# Patient Record
Sex: MALE | Race: OTHER | Employment: PART TIME | URBAN - METROPOLITAN AREA
[De-identification: names, ages, dates, MRNs, and addresses within clinical notes are randomized per-mention and may not be internally consistent; named-entity substitution may affect disease eponyms.]

---

## 2018-01-10 ENCOUNTER — HOSPITAL ENCOUNTER (INPATIENT)
Age: 33
LOS: 2 days | Discharge: HOME HEALTH | DRG: 310 | End: 2018-01-12
Attending: EMERGENCY MEDICINE | Admitting: FAMILY MEDICINE

## 2018-01-10 ENCOUNTER — APPOINTMENT (OUTPATIENT)
Dept: GENERAL RADIOLOGY | Age: 33
DRG: 310 | End: 2018-01-10

## 2018-01-10 DIAGNOSIS — R07.9 CHEST PAIN, UNSPECIFIED TYPE: ICD-10-CM

## 2018-01-10 DIAGNOSIS — G47.33 OBSTRUCTIVE SLEEP APNEA SYNDROME: Primary | ICD-10-CM

## 2018-01-10 DIAGNOSIS — I48.91 ATRIAL FIBRILLATION, UNSPECIFIED TYPE (HCC): ICD-10-CM

## 2018-01-10 LAB
ABSOLUTE EOS #: 0 K/UL (ref 0–0.4)
ABSOLUTE IMMATURE GRANULOCYTE: ABNORMAL K/UL (ref 0–0.3)
ABSOLUTE LYMPH #: 2 K/UL (ref 1–4.8)
ABSOLUTE MONO #: 0.5 K/UL (ref 0.2–0.8)
ALBUMIN SERPL-MCNC: 4.7 G/DL (ref 3.5–5.2)
ALBUMIN/GLOBULIN RATIO: NORMAL (ref 1–2.5)
ALP BLD-CCNC: 81 U/L (ref 40–129)
ALT SERPL-CCNC: 21 U/L (ref 5–41)
AMPHETAMINE SCREEN URINE: NEGATIVE
ANION GAP SERPL CALCULATED.3IONS-SCNC: 17 MMOL/L (ref 9–17)
AST SERPL-CCNC: 18 U/L
BARBITURATE SCREEN URINE: NEGATIVE
BASOPHILS # BLD: 0 % (ref 0–2)
BASOPHILS ABSOLUTE: 0 K/UL (ref 0–0.2)
BENZODIAZEPINE SCREEN, URINE: NEGATIVE
BILIRUB SERPL-MCNC: 0.81 MG/DL (ref 0.3–1.2)
BILIRUBIN DIRECT: 0.14 MG/DL
BILIRUBIN URINE: NEGATIVE
BILIRUBIN, INDIRECT: 0.67 MG/DL (ref 0–1)
BUN BLDV-MCNC: 15 MG/DL (ref 6–20)
BUN/CREAT BLD: 18 (ref 9–20)
BUPRENORPHINE URINE: ABNORMAL
CALCIUM SERPL-MCNC: 9.5 MG/DL (ref 8.6–10.4)
CANNABINOID SCREEN URINE: NEGATIVE
CHLORIDE BLD-SCNC: 101 MMOL/L (ref 98–107)
CO2: 23 MMOL/L (ref 20–31)
COCAINE METABOLITE, URINE: NEGATIVE
COLOR: YELLOW
COMMENT UA: NORMAL
CREAT SERPL-MCNC: 0.82 MG/DL (ref 0.7–1.2)
D-DIMER QUANTITATIVE: 0.29 MG/L FEU
DIFFERENTIAL TYPE: ABNORMAL
EKG ATRIAL RATE: 153 BPM
EKG Q-T INTERVAL: 340 MS
EKG QRS DURATION: 84 MS
EKG QTC CALCULATION (BAZETT): 401 MS
EKG R AXIS: 38 DEGREES
EKG T AXIS: 42 DEGREES
EKG VENTRICULAR RATE: 84 BPM
EOSINOPHILS RELATIVE PERCENT: 0 % (ref 1–4)
GFR AFRICAN AMERICAN: >60 ML/MIN
GFR NON-AFRICAN AMERICAN: >60 ML/MIN
GFR SERPL CREATININE-BSD FRML MDRD: NORMAL ML/MIN/{1.73_M2}
GFR SERPL CREATININE-BSD FRML MDRD: NORMAL ML/MIN/{1.73_M2}
GLOBULIN: NORMAL G/DL (ref 1.5–3.8)
GLUCOSE BLD-MCNC: 84 MG/DL (ref 70–99)
GLUCOSE URINE: NEGATIVE
HCT VFR BLD CALC: 48.7 % (ref 41–53)
HEMOGLOBIN: 16.4 G/DL (ref 13.5–17.5)
IMMATURE GRANULOCYTES: ABNORMAL %
INR BLD: 1
KETONES, URINE: NEGATIVE
LEUKOCYTE ESTERASE, URINE: NEGATIVE
LYMPHOCYTES # BLD: 30 % (ref 24–44)
MAGNESIUM: 2 MG/DL (ref 1.6–2.6)
MCH RBC QN AUTO: 27.9 PG (ref 26–34)
MCHC RBC AUTO-ENTMCNC: 33.7 G/DL (ref 31–37)
MCV RBC AUTO: 82.7 FL (ref 80–100)
MDMA URINE: ABNORMAL
METHADONE SCREEN, URINE: NEGATIVE
METHAMPHETAMINE, URINE: ABNORMAL
MONOCYTES # BLD: 7 % (ref 1–7)
MYOGLOBIN: <21 NG/ML (ref 28–72)
NITRITE, URINE: NEGATIVE
OPIATES, URINE: POSITIVE
OXYCODONE SCREEN URINE: NEGATIVE
PARTIAL THROMBOPLASTIN TIME: 31.5 SEC (ref 23–31)
PDW BLD-RTO: 12.9 % (ref 11.5–14.5)
PH UA: 6 (ref 5–8)
PHENCYCLIDINE, URINE: NEGATIVE
PLATELET # BLD: 220 K/UL (ref 130–400)
PLATELET ESTIMATE: ABNORMAL
PMV BLD AUTO: 9.9 FL (ref 6–12)
POTASSIUM SERPL-SCNC: 4.4 MMOL/L (ref 3.7–5.3)
PROPOXYPHENE, URINE: ABNORMAL
PROTEIN UA: NEGATIVE
PROTHROMBIN TIME: 10.2 SEC (ref 9.7–11.6)
RBC # BLD: 5.89 M/UL (ref 4.5–5.9)
RBC # BLD: ABNORMAL 10*6/UL
SEG NEUTROPHILS: 63 % (ref 36–66)
SEGMENTED NEUTROPHILS ABSOLUTE COUNT: 4.2 K/UL (ref 1.8–7.7)
SODIUM BLD-SCNC: 141 MMOL/L (ref 135–144)
SPECIFIC GRAVITY UA: 1.02 (ref 1–1.03)
TEST INFORMATION: ABNORMAL
THYROXINE, FREE: 1.24 NG/DL (ref 0.93–1.7)
TOTAL PROTEIN: 7.5 G/DL (ref 6.4–8.3)
TRICYCLIC ANTIDEPRESSANTS, UR: ABNORMAL
TROPONIN INTERP: ABNORMAL
TROPONIN T: <0.03 NG/ML
TSH SERPL DL<=0.05 MIU/L-ACNC: 1.68 MIU/L (ref 0.3–5)
TURBIDITY: CLEAR
URINE HGB: NEGATIVE
UROBILINOGEN, URINE: NORMAL
WBC # BLD: 6.7 K/UL (ref 3.5–11)
WBC # BLD: ABNORMAL 10*3/UL

## 2018-01-10 PROCEDURE — 85730 THROMBOPLASTIN TIME PARTIAL: CPT

## 2018-01-10 PROCEDURE — 6370000000 HC RX 637 (ALT 250 FOR IP): Performed by: EMERGENCY MEDICINE

## 2018-01-10 PROCEDURE — G0480 DRUG TEST DEF 1-7 CLASSES: HCPCS

## 2018-01-10 PROCEDURE — 85610 PROTHROMBIN TIME: CPT

## 2018-01-10 PROCEDURE — 81003 URINALYSIS AUTO W/O SCOPE: CPT

## 2018-01-10 PROCEDURE — 71045 X-RAY EXAM CHEST 1 VIEW: CPT

## 2018-01-10 PROCEDURE — 84439 ASSAY OF FREE THYROXINE: CPT

## 2018-01-10 PROCEDURE — 96374 THER/PROPH/DIAG INJ IV PUSH: CPT

## 2018-01-10 PROCEDURE — 1200000000 HC SEMI PRIVATE

## 2018-01-10 PROCEDURE — 80307 DRUG TEST PRSMV CHEM ANLYZR: CPT

## 2018-01-10 PROCEDURE — 80076 HEPATIC FUNCTION PANEL: CPT

## 2018-01-10 PROCEDURE — 83874 ASSAY OF MYOGLOBIN: CPT

## 2018-01-10 PROCEDURE — 84484 ASSAY OF TROPONIN QUANT: CPT

## 2018-01-10 PROCEDURE — 84443 ASSAY THYROID STIM HORMONE: CPT

## 2018-01-10 PROCEDURE — 2060000000 HC ICU INTERMEDIATE R&B

## 2018-01-10 PROCEDURE — 85025 COMPLETE CBC W/AUTO DIFF WBC: CPT

## 2018-01-10 PROCEDURE — 99285 EMERGENCY DEPT VISIT HI MDM: CPT

## 2018-01-10 PROCEDURE — 96375 TX/PRO/DX INJ NEW DRUG ADDON: CPT

## 2018-01-10 PROCEDURE — 85379 FIBRIN DEGRADATION QUANT: CPT

## 2018-01-10 PROCEDURE — 6360000002 HC RX W HCPCS: Performed by: EMERGENCY MEDICINE

## 2018-01-10 PROCEDURE — 80048 BASIC METABOLIC PNL TOTAL CA: CPT

## 2018-01-10 PROCEDURE — 83735 ASSAY OF MAGNESIUM: CPT

## 2018-01-10 PROCEDURE — 93005 ELECTROCARDIOGRAM TRACING: CPT

## 2018-01-10 RX ORDER — POTASSIUM CHLORIDE 7.45 MG/ML
10 INJECTION INTRAVENOUS PRN
Status: DISCONTINUED | OUTPATIENT
Start: 2018-01-10 | End: 2018-01-12 | Stop reason: HOSPADM

## 2018-01-10 RX ORDER — DOCUSATE SODIUM 100 MG/1
100 CAPSULE, LIQUID FILLED ORAL 2 TIMES DAILY
Status: DISCONTINUED | OUTPATIENT
Start: 2018-01-11 | End: 2018-01-12 | Stop reason: HOSPADM

## 2018-01-10 RX ORDER — HYDROCODONE BITARTRATE AND ACETAMINOPHEN 5; 325 MG/1; MG/1
1 TABLET ORAL EVERY 4 HOURS PRN
Status: DISCONTINUED | OUTPATIENT
Start: 2018-01-10 | End: 2018-01-12 | Stop reason: HOSPADM

## 2018-01-10 RX ORDER — MORPHINE SULFATE 2 MG/ML
2 INJECTION, SOLUTION INTRAMUSCULAR; INTRAVENOUS
Status: DISCONTINUED | OUTPATIENT
Start: 2018-01-10 | End: 2018-01-12 | Stop reason: HOSPADM

## 2018-01-10 RX ORDER — SODIUM CHLORIDE 0.9 % (FLUSH) 0.9 %
10 SYRINGE (ML) INJECTION EVERY 12 HOURS SCHEDULED
Status: DISCONTINUED | OUTPATIENT
Start: 2018-01-11 | End: 2018-01-12 | Stop reason: HOSPADM

## 2018-01-10 RX ORDER — BISACODYL 10 MG
10 SUPPOSITORY, RECTAL RECTAL DAILY PRN
Status: DISCONTINUED | OUTPATIENT
Start: 2018-01-10 | End: 2018-01-12 | Stop reason: HOSPADM

## 2018-01-10 RX ORDER — ASPIRIN 81 MG/1
324 TABLET, CHEWABLE ORAL ONCE
Status: COMPLETED | OUTPATIENT
Start: 2018-01-10 | End: 2018-01-10

## 2018-01-10 RX ORDER — ONDANSETRON 2 MG/ML
4 INJECTION INTRAMUSCULAR; INTRAVENOUS EVERY 6 HOURS PRN
Status: DISCONTINUED | OUTPATIENT
Start: 2018-01-10 | End: 2018-01-12 | Stop reason: HOSPADM

## 2018-01-10 RX ORDER — NITROGLYCERIN 0.4 MG/1
0.4 TABLET SUBLINGUAL EVERY 5 MIN PRN
Status: DISCONTINUED | OUTPATIENT
Start: 2018-01-10 | End: 2018-01-12

## 2018-01-10 RX ORDER — SODIUM CHLORIDE 0.9 % (FLUSH) 0.9 %
10 SYRINGE (ML) INJECTION PRN
Status: DISCONTINUED | OUTPATIENT
Start: 2018-01-10 | End: 2018-01-12 | Stop reason: HOSPADM

## 2018-01-10 RX ORDER — ONDANSETRON 2 MG/ML
4 INJECTION INTRAMUSCULAR; INTRAVENOUS ONCE
Status: COMPLETED | OUTPATIENT
Start: 2018-01-10 | End: 2018-01-10

## 2018-01-10 RX ORDER — MORPHINE SULFATE 4 MG/ML
4 INJECTION, SOLUTION INTRAMUSCULAR; INTRAVENOUS
Status: DISCONTINUED | OUTPATIENT
Start: 2018-01-10 | End: 2018-01-12 | Stop reason: HOSPADM

## 2018-01-10 RX ORDER — POTASSIUM CHLORIDE 20 MEQ/1
40 TABLET, EXTENDED RELEASE ORAL PRN
Status: DISCONTINUED | OUTPATIENT
Start: 2018-01-10 | End: 2018-01-12 | Stop reason: HOSPADM

## 2018-01-10 RX ORDER — MAGNESIUM SULFATE 1 G/100ML
1 INJECTION INTRAVENOUS PRN
Status: DISCONTINUED | OUTPATIENT
Start: 2018-01-10 | End: 2018-01-12 | Stop reason: HOSPADM

## 2018-01-10 RX ORDER — MORPHINE SULFATE 2 MG/ML
2 INJECTION, SOLUTION INTRAMUSCULAR; INTRAVENOUS ONCE
Status: COMPLETED | OUTPATIENT
Start: 2018-01-10 | End: 2018-01-10

## 2018-01-10 RX ORDER — ACETAMINOPHEN 325 MG/1
650 TABLET ORAL EVERY 4 HOURS PRN
Status: DISCONTINUED | OUTPATIENT
Start: 2018-01-10 | End: 2018-01-12 | Stop reason: HOSPADM

## 2018-01-10 RX ORDER — POTASSIUM CHLORIDE 20MEQ/15ML
40 LIQUID (ML) ORAL PRN
Status: DISCONTINUED | OUTPATIENT
Start: 2018-01-10 | End: 2018-01-12 | Stop reason: HOSPADM

## 2018-01-10 RX ADMIN — ONDANSETRON 4 MG: 2 INJECTION INTRAMUSCULAR; INTRAVENOUS at 20:29

## 2018-01-10 RX ADMIN — MORPHINE SULFATE 2 MG: 2 INJECTION, SOLUTION INTRAMUSCULAR; INTRAVENOUS at 20:43

## 2018-01-10 RX ADMIN — ASPIRIN 81 MG 324 MG: 81 TABLET ORAL at 20:28

## 2018-01-10 ASSESSMENT — ENCOUNTER SYMPTOMS
SORE THROAT: 0
RHINORRHEA: 0
DIARRHEA: 0
ABDOMINAL PAIN: 0
COLOR CHANGE: 0
SINUS PRESSURE: 0
COUGH: 0
SHORTNESS OF BREATH: 0
NAUSEA: 0
PHOTOPHOBIA: 0
EYE PAIN: 0
VOMITING: 0

## 2018-01-10 ASSESSMENT — PAIN SCALES - GENERAL
PAINLEVEL_OUTOF10: 5
PAINLEVEL_OUTOF10: 2

## 2018-01-10 ASSESSMENT — PAIN DESCRIPTION - DESCRIPTORS: DESCRIPTORS: ACHING

## 2018-01-10 ASSESSMENT — PAIN DESCRIPTION - PAIN TYPE: TYPE: ACUTE PAIN

## 2018-01-10 ASSESSMENT — PAIN DESCRIPTION - LOCATION: LOCATION: CHEST

## 2018-01-10 NOTE — ED PROVIDER NOTES
Musculoskeletal: Negative for arthralgias, myalgias, neck pain and neck stiffness. Skin: Negative for color change and rash. Neurological: Positive for dizziness. Negative for syncope, facial asymmetry, speech difficulty, weakness, light-headedness, numbness and headaches. Except as noted above the remainder of the review of systems was reviewed and negative. PHYSICAL EXAM    (up to 7 for level 4, 8 or more for level 5)     ED Triage Vitals [01/10/18 1753]   BP Temp Temp src Pulse Resp SpO2 Height Weight   (!) 140/77 98.3 °F (36.8 °C) -- 104 18 98 % 5' 9\" (1.753 m) 184 lb 1 oz (83.5 kg)     Physical Exam   Constitutional: He is oriented to person, place, and time. He appears well-developed and well-nourished. No distress. HENT:   Mouth/Throat: Oropharynx is clear and moist.   Eyes: Conjunctivae and EOM are normal. Pupils are equal, round, and reactive to light. Cardiovascular: Normal rate, regular rhythm and normal heart sounds. Pulmonary/Chest: Effort normal and breath sounds normal. No respiratory distress. He has no wheezes. He has no rales. Abdominal: Soft. Bowel sounds are normal. He exhibits no distension. There is no tenderness. Musculoskeletal: He exhibits no edema. Neurological: He is alert and oriented to person, place, and time. No cranial nerve deficit. GCS eye subscore is 4. GCS verbal subscore is 5. GCS motor subscore is 6. Skin: Skin is warm and dry. No rash noted. He is not diaphoretic. Psychiatric: He has a normal mood and affect. His behavior is normal.   Vitals reviewed. DIAGNOSTIC RESULTS     EKG: All EKG's are interpreted by the Emergency Department Physician who either signs or Co-signs this chart in the absence of a cardiologist.  EKG showed a-fib. It was interpreted by Dr. Deep Romero after completion.       RADIOLOGY:   Non-plain film images such as CT, Ultrasound and MRI are read by the radiologist. Plain radiographic images are visualized and preliminarily JOELLE Baptiste NP  01/10/18 0693

## 2018-01-11 ENCOUNTER — APPOINTMENT (OUTPATIENT)
Dept: NUCLEAR MEDICINE | Age: 33
DRG: 310 | End: 2018-01-11

## 2018-01-11 PROBLEM — E78.1 HYPERTRIGLYCERIDEMIA: Status: ACTIVE | Noted: 2018-01-11

## 2018-01-11 PROBLEM — I48.0 PAROXYSMAL ATRIAL FIBRILLATION (HCC): Status: ACTIVE | Noted: 2018-01-11

## 2018-01-11 LAB
ACETAMINOPHEN LEVEL: <10 UG/ML (ref 10–30)
ALBUMIN SERPL-MCNC: 4.1 G/DL (ref 3.5–5.2)
ALBUMIN/GLOBULIN RATIO: NORMAL (ref 1–2.5)
ALP BLD-CCNC: 74 U/L (ref 40–129)
ALT SERPL-CCNC: 20 U/L (ref 5–41)
ANION GAP SERPL CALCULATED.3IONS-SCNC: 11 MMOL/L (ref 9–17)
AST SERPL-CCNC: 16 U/L
BILIRUB SERPL-MCNC: 0.74 MG/DL (ref 0.3–1.2)
BUN BLDV-MCNC: 15 MG/DL (ref 6–20)
BUN/CREAT BLD: 16 (ref 9–20)
CALCIUM SERPL-MCNC: 8.8 MG/DL (ref 8.6–10.4)
CHLORIDE BLD-SCNC: 104 MMOL/L (ref 98–107)
CHOLESTEROL/HDL RATIO: 6.5
CHOLESTEROL: 194 MG/DL
CO2: 27 MMOL/L (ref 20–31)
CREAT SERPL-MCNC: 0.91 MG/DL (ref 0.7–1.2)
ETHANOL PERCENT: <0.01 %
ETHANOL: <10 MG/DL
GFR AFRICAN AMERICAN: >60 ML/MIN
GFR NON-AFRICAN AMERICAN: >60 ML/MIN
GFR SERPL CREATININE-BSD FRML MDRD: NORMAL ML/MIN/{1.73_M2}
GFR SERPL CREATININE-BSD FRML MDRD: NORMAL ML/MIN/{1.73_M2}
GLUCOSE BLD-MCNC: 99 MG/DL (ref 70–99)
HCT VFR BLD CALC: 46.7 % (ref 41–53)
HDLC SERPL-MCNC: 30 MG/DL
HEMOGLOBIN: 15.9 G/DL (ref 13.5–17.5)
LDL CHOLESTEROL: 121 MG/DL (ref 0–130)
LV EF: 51 %
LV EF: 65 %
LVEF MODALITY: NORMAL
LVEF MODALITY: NORMAL
MAGNESIUM: 2.2 MG/DL (ref 1.6–2.6)
MCH RBC QN AUTO: 28.6 PG (ref 26–34)
MCHC RBC AUTO-ENTMCNC: 34.1 G/DL (ref 31–37)
MCV RBC AUTO: 83.8 FL (ref 80–100)
PDW BLD-RTO: 13 % (ref 11.5–14.5)
PLATELET # BLD: 211 K/UL (ref 130–400)
PMV BLD AUTO: 9.6 FL (ref 6–12)
POTASSIUM SERPL-SCNC: 3.9 MMOL/L (ref 3.7–5.3)
RBC # BLD: 5.57 M/UL (ref 4.5–5.9)
SALICYLATE LEVEL: <1 MG/DL (ref 3–10)
SODIUM BLD-SCNC: 142 MMOL/L (ref 135–144)
T3 FREE: 4.02 PG/ML (ref 2.02–4.43)
T4 TOTAL: 7.1 UG/DL (ref 4.5–12)
TOTAL PROTEIN: 6.7 G/DL (ref 6.4–8.3)
TOXIC TRICYCLIC SC,BLOOD: NEGATIVE
TRIGL SERPL-MCNC: 217 MG/DL
TROPONIN INTERP: NORMAL
TROPONIN INTERP: NORMAL
TROPONIN T: <0.03 NG/ML
TROPONIN T: <0.03 NG/ML
VLDLC SERPL CALC-MCNC: ABNORMAL MG/DL (ref 1–30)
WBC # BLD: 7.8 K/UL (ref 3.5–11)

## 2018-01-11 PROCEDURE — 6360000002 HC RX W HCPCS: Performed by: INTERNAL MEDICINE

## 2018-01-11 PROCEDURE — 93306 TTE W/DOPPLER COMPLETE: CPT

## 2018-01-11 PROCEDURE — A9500 TC99M SESTAMIBI: HCPCS | Performed by: INTERNAL MEDICINE

## 2018-01-11 PROCEDURE — 80053 COMPREHEN METABOLIC PANEL: CPT

## 2018-01-11 PROCEDURE — 1200000000 HC SEMI PRIVATE

## 2018-01-11 PROCEDURE — 84481 FREE ASSAY (FT-3): CPT

## 2018-01-11 PROCEDURE — 80061 LIPID PANEL: CPT

## 2018-01-11 PROCEDURE — 3430000000 HC RX DIAGNOSTIC RADIOPHARMACEUTICAL: Performed by: INTERNAL MEDICINE

## 2018-01-11 PROCEDURE — 84484 ASSAY OF TROPONIN QUANT: CPT

## 2018-01-11 PROCEDURE — 78452 HT MUSCLE IMAGE SPECT MULT: CPT

## 2018-01-11 PROCEDURE — 84436 ASSAY OF TOTAL THYROXINE: CPT

## 2018-01-11 PROCEDURE — 2580000003 HC RX 258: Performed by: INTERNAL MEDICINE

## 2018-01-11 PROCEDURE — 99222 1ST HOSP IP/OBS MODERATE 55: CPT | Performed by: INTERNAL MEDICINE

## 2018-01-11 PROCEDURE — 6370000000 HC RX 637 (ALT 250 FOR IP): Performed by: INTERNAL MEDICINE

## 2018-01-11 PROCEDURE — 36415 COLL VENOUS BLD VENIPUNCTURE: CPT

## 2018-01-11 PROCEDURE — 6370000000 HC RX 637 (ALT 250 FOR IP): Performed by: NURSE PRACTITIONER

## 2018-01-11 PROCEDURE — 83735 ASSAY OF MAGNESIUM: CPT

## 2018-01-11 PROCEDURE — 85027 COMPLETE CBC AUTOMATED: CPT

## 2018-01-11 PROCEDURE — 93017 CV STRESS TEST TRACING ONLY: CPT

## 2018-01-11 PROCEDURE — 2580000003 HC RX 258: Performed by: NURSE PRACTITIONER

## 2018-01-11 RX ORDER — 0.9 % SODIUM CHLORIDE 0.9 %
250 INTRAVENOUS SOLUTION INTRAVENOUS ONCE
Status: DISCONTINUED | OUTPATIENT
Start: 2018-01-11 | End: 2018-01-12 | Stop reason: HOSPADM

## 2018-01-11 RX ORDER — SODIUM CHLORIDE 0.9 % (FLUSH) 0.9 %
10 SYRINGE (ML) INJECTION PRN
Status: ACTIVE | OUTPATIENT
Start: 2018-01-11 | End: 2018-01-12

## 2018-01-11 RX ORDER — AMINOPHYLLINE DIHYDRATE 25 MG/ML
100 INJECTION, SOLUTION INTRAVENOUS
Status: ACTIVE | OUTPATIENT
Start: 2018-01-11 | End: 2018-01-11

## 2018-01-11 RX ORDER — METOPROLOL TARTRATE 5 MG/5ML
2.5 INJECTION INTRAVENOUS PRN
Status: ACTIVE | OUTPATIENT
Start: 2018-01-11 | End: 2018-01-12

## 2018-01-11 RX ORDER — NITROGLYCERIN 0.4 MG/1
0.4 TABLET SUBLINGUAL EVERY 5 MIN PRN
Status: ACTIVE | OUTPATIENT
Start: 2018-01-11 | End: 2018-01-12

## 2018-01-11 RX ADMIN — Medication 10 ML: at 21:51

## 2018-01-11 RX ADMIN — TETRAKIS(2-METHOXYISOBUTYLISOCYANIDE)COPPER(I) TETRAFLUOROBORATE 18.4 MILLICURIE: 1 INJECTION, POWDER, LYOPHILIZED, FOR SOLUTION INTRAVENOUS at 08:20

## 2018-01-11 RX ADMIN — REGADENOSON 0.4 MG: 0.08 INJECTION, SOLUTION INTRAVENOUS at 07:58

## 2018-01-11 RX ADMIN — DILTIAZEM HYDROCHLORIDE 30 MG: 30 TABLET, FILM COATED ORAL at 12:44

## 2018-01-11 RX ADMIN — ASPIRIN 325 MG: 325 TABLET, DELAYED RELEASE ORAL at 10:00

## 2018-01-11 RX ADMIN — TETRAKIS(2-METHOXYISOBUTYLISOCYANIDE)COPPER(I) TETRAFLUOROBORATE 40.1 MILLICURIE: 1 INJECTION, POWDER, LYOPHILIZED, FOR SOLUTION INTRAVENOUS at 12:45

## 2018-01-11 RX ADMIN — Medication 10 ML: at 07:57

## 2018-01-11 RX ADMIN — DILTIAZEM HYDROCHLORIDE 30 MG: 30 TABLET, FILM COATED ORAL at 19:18

## 2018-01-11 RX ADMIN — DILTIAZEM HYDROCHLORIDE 30 MG: 30 TABLET, FILM COATED ORAL at 23:37

## 2018-01-11 RX ADMIN — ENOXAPARIN SODIUM 80 MG: 80 INJECTION SUBCUTANEOUS at 21:51

## 2018-01-11 RX ADMIN — Medication 10 ML: at 10:00

## 2018-01-11 ASSESSMENT — ENCOUNTER SYMPTOMS
SHORTNESS OF BREATH: 0
NAUSEA: 0
BLOOD IN STOOL: 0
HEARTBURN: 0
BLURRED VISION: 0
VOMITING: 0
WHEEZING: 0
SPUTUM PRODUCTION: 0
COUGH: 0

## 2018-01-11 NOTE — CARE COORDINATION
Case Management Initial Discharge Plan  Renee Patel,         Readmission Risk              Readmission Risk:        1.25       Age 73 or Greater:  0    Admitted from SNF or Requires Paid or Family Care:  0    Currently has CHF,COPD,ARF,CRI,or is on dialysis:  0    Takes more than 5 Prescription Medications:  0    Takes Digoxin,Insulin,Anticoagulants,Narcotics or ASA/Plavix:  [address] in Past 12 Months:  0    On Disability:  0    Patient Considers own Health:  1.25            Met with:patient to discuss discharge plans. Information verified: address, contacts, phone number, , insurance Yes  PCP: No primary care provider on file. Date of last visit: na    Insurance Provider: none, has care net with the [facility]    Discharge Planning  Current Residence:  Private home  Living Arrangements:  Spouse/Significant Other   Home has 1 stories/1 stairs to climb  Support Systems:  Family Members, Spouse/Significant Other     Current Services PTA:  None   Agency: none     Patient able to perform ADL's:Independent  DME in home:  None   DME used to aid ambulation prior to admission:   na  DME used during admission:  none    Potential Assistance Needed:  N/A    Pharmacy: [facility] on [address]   Potential Assistance Purchasing Medications:  No  Does patient want to participate in local refill/ meds to beds program?  No    Patient agreeable to home care: No  [place] of choice provided:  n/a      Type of Home Care Services:  None  Patient expects to be discharged to:  home    Prior SNF/Rehab Placement and Facility: none  Agreeable to SNF/Rehab: No  [place] of choice provided: no   Evaluation: n/a    Expected Discharge date:  18  Follow Up Appointment: Best Day/ Time: Wednesday PM    Transportation provider: per wife  Transportation arrangements needed for discharge: No    Discharge Plan:   Patient lives with wife, limited [address]  Concern for a fib and need for anticoagulation without any insurance. Will have to monitor closely. He also had stress test today that was negative with the exception of a fib with controlled ventricular response.      Follow up with a fib         Electronically signed by Shubham Mabry RN on 1/11/18 at 1:04 PM

## 2018-01-11 NOTE — H&P
patient's allergies indicates no known allergies. Social History:     Tobacco:    reports that he has never smoked. He has never used smokeless tobacco.  Alcohol:      reports that he does not drink alcohol. Drug Use:  reports that he does not use drugs. Family History:     Family History   Problem Relation Age of Onset    Hypertension Mother     Heart Disease Mother     No Known Problems Father        Review of Systems:     Positive and Negative as described in HPI. Review of Systems   Constitutional: Positive for diaphoresis. Negative for chills and fever. HENT: Negative for hearing loss. Eyes: Negative for blurred vision. Respiratory: Negative for cough, sputum production, shortness of breath and wheezing. Cardiovascular: Positive for chest pain and palpitations. Negative for leg swelling. Gastrointestinal: Negative for blood in stool, heartburn, melena, nausea and vomiting. Genitourinary: Negative for flank pain and hematuria. Musculoskeletal: Negative for myalgias and neck pain. Skin: Negative for rash. Neurological: Negative for dizziness, seizures and loss of consciousness. Physical Exam:   /84   Pulse 82   Temp 97.9 °F (36.6 °C) (Oral)   Resp 18   Ht 5' 9\" (1.753 m)   Wt 184 lb 1 oz (83.5 kg)   SpO2 98%   BMI 27.18 kg/m²    Temp (24hrs), Av °F (36.7 °C), Min:97.7 °F (36.5 °C), Max:98.3 °F (36.8 °C)    No results for input(s): POCGLU in the last 72 hours. No intake or output data in the 24 hours ending 18 1116    Physical Exam   Constitutional: He is oriented to person, place, and time. No distress. HENT:   Head: Normocephalic and atraumatic. Eyes: Conjunctivae are normal. No scleral icterus. Neck: Neck supple. No JVD present. Cardiovascular: Normal rate, regular rhythm and normal heart sounds. Pulmonary/Chest: Effort normal and breath sounds normal. No respiratory distress. He has no wheezes. Abdominal: Soft.  Bowel sounds are normal.

## 2018-01-11 NOTE — ED PROVIDER NOTES
[address] [place] [address] ED  eMERGENCY dEPARTMENT eNCOUnter      Pt Name: Gabrielle Hart  MRN: [id number]  [place] 1985  Date of evaluation: 1/10/2018  Provider: Jessica Nguyen MD    CHIEF COMPLAINT       Chief Complaint   Patient presents with    Chest Pain     Patient seen in conjunction with mid-level provider. Patient presented with new-onset A. Fib. Initial laboratory and imaging evaluations are benign. EKG demonstrates A. fib with controlled ventricular response. Sustained telemetry tracing remains in A. fib. Patient continues to endorse palpitations. Care is discussed with internal medicine to facilitate admission for additional evaluation. DIAGNOSTIC RESULTS         RADIOLOGY:   Non-plain film images such as CT, Ultrasound and MRI are read by the radiologist. Plain radiographic images are visualized and preliminarily interpreted by the emergency physician with the below findings:    XR CHEST 1 VW   Status: Final result   Order Providers     Authorizing Billing   Laney Lukes, NP Glenda Sacks, MD          Signed by     Signed Date/Time  Phone Pager   Yuni Soler 1/10/2018 19:11 [phone]    Reading Radiologists     Read Date Phone Pager   Yuni Clear David 10, 2018 [phone]    Narrative   EXAMINATION:   SINGLE VIEW OF THE CHEST       1/10/2018 6:31 pm       COMPARISON:   None.       HISTORY:   ORDERING SYSTEM PROVIDED HISTORY: cp   TECHNOLOGIST PROVIDED HISTORY:   Reason for exam:->cp   Ordering Physician Provided Reason for Exam: chest pain, SOB   Acuity: Acute   Type of Exam: Initial       FINDINGS:   Lungs are clear.  No cardiomegaly.  No pulmonary edema.           Impression   Negative chest radiograph.             Interpretation per the Radiologist below, if available at the time of this note:    XR CHEST 1 VW   Final Result   Negative chest radiograph.                LABS:  Labs Reviewed   CBC WITH AUTO DIFFERENTIAL - Abnormal; Notable for the following: WBC Latest Ref Range: 3.5 - 11.0 k/uL 6.7   RBC Latest Ref Range: 4.5 - 5.9 m/uL 5.89   Hemoglobin Quant Latest Ref Range: 13.5 - 17.5 g/dL 16.4   Hematocrit Latest Ref Range: 41 - 53 % 48.7   MCV Latest Ref Range: 80 - 100 fL 82.7   MCH Latest Ref Range: 26 - 34 pg 27.9   MCHC Latest Ref Range: 31 - 37 g/dL 33.7   MPV Latest Ref Range: 6.0 - 12.0 fL 9.9   RDW Latest Ref Range: 11.5 - 14.5 % 12.9   Platelet Count Latest Ref Range: 130 - 400 k/uL 220   Platelet Estimate Unknown NOT REPORTED   Absolute Mono # Latest Ref Range: 0.2 - 0.8 k/uL 0.50   Eosinophils % Latest Ref Range: 1 - 4 % 0 (L)   Basophils # Latest Ref Range: 0.0 - 0.2 k/uL 0.00   Differential Type Unknown NOT REPORTED   Seg Neutrophils Latest Ref Range: 36 - 66 % 63   Segs Absolute Latest Ref Range: 1.8 - 7.7 k/uL 4.20   Lymphocytes Latest Ref Range: 24 - 44 % 30   Absolute Lymph # Latest Ref Range: 1.0 - 4.8 k/uL 2.00   Monocytes Latest Ref Range: 1 - 7 % 7   Absolute Eos # Latest Ref Range: 0.0 - 0.4 k/uL 0.00   Basophils Latest Ref Range: 0 - 2 % 0   Immature Granulocytes Latest Ref Range: 0 % NOT REPORTED   WBC Morphology Unknown NOT REPORTED   RBC Morphology Unknown NOT REPORTED   Prothrombin Time Latest Ref Range: 9.7 - 11.6 sec 10.2   INR Unknown 1.0   PTT Latest Ref Range: 23 - 31 sec 31.5 (H)   Results for Lovett Kehr (MRN [id number]) as of 1/10/2018 21:35   Ref. Range 1/10/2018 18:17   D-Dimer, Quant Latest Units: mg/L FEU 0.29     All other labs were within normal range or not returned as of this dictation. EMERGENCY DEPARTMENT COURSE and DIFFERENTIAL DIAGNOSIS/MDM:   Vitals:    Vitals:    01/10/18 1830 01/10/18 1845 01/10/18 1900 01/10/18 1915   BP: 129/83 126/79 122/87 124/64   Pulse: 93 88 84 80   Resp:       Temp:       SpO2: 100% 100% 100% 100%   Weight:       Height:             CONSULTS:  IP CONSULT TO INTERNAL MEDICINE    PROCEDURES:  None    FINAL IMPRESSION      1.  Atrial fibrillation, unspecified type ([facility] [address]    2. Chest

## 2018-01-11 NOTE — CONSULTS
[facility]  In PatientCardiology Consult             Cardiology   Consult Note          History Obtained From:  patient    HISTORY OF PRESENT ILLNESS:      The patient is a 33 y.o. male seen for atrial fibrillation. Patient presents to the ED for chest pressure associated with intermittent dizziness, palpitations. 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 Was told [place] ago [place] he had irregular rhythm and was given a holter monitor. Past Medical History:    Past Medical History:   Diagnosis Date    Bradycardia      Past Surgical History:    History reviewed. No pertinent surgical history. Home Medications:  Prior to Admission medications    Not on File     Current Inpatient Medications:   0.9 % sodium chloride  250 mL Intravenous Once    diltiazem  30 mg Oral 4 times per day    sodium chloride flush  10 mL Intravenous 2 times per day    docusate sodium  100 mg Oral BID    aspirin  325 mg Oral Daily    enoxaparin  40 mg Subcutaneous Daily     Allergies:  Review of patient's allergies indicates no known allergies. Social History:    Social History     Social History    Marital status:      Spouse name: N/A    Number of children: N/A    Years of education: N/A     Occupational History    Not on file.      Social History Main Topics    Smoking status: Never Smoker    Smokeless tobacco: Never Used    Alcohol use No    Drug use: No    Sexual activity: Yes     Partners: Female     Other Topics Concern    Not on file     Social History Narrative    No narrative on file     Family History:   Family History   Problem Relation Age of Onset    Hypertension Mother     Heart Disease Mother     No Known Problems Father        REVIEW OF SYSTEMS:   CONSTITUTIONAL:  fatigue  HEENT:  negative  RESPIRATORY:  dyspnea  CARDIOVASCULAR:  positive for  chest pain (non anginal), dyspnea, palpitations  GASTROINTESTINAL:  negative  GENITOURINARY: 01/11/2018    GFRAA >60 01/11/2018    LABGLOM >60 01/11/2018    GLUCOSE 99 01/11/2018     FLP:    Lab Results   Component Value Date    CHOL 194 01/11/2018    TRIG 217 01/11/2018    HDL 30 01/11/2018    LDLCHOLESTEROL 121 01/11/2018       IMPRESSION:  ATRIAL FIBRILLATION, UNKNOWN TIME OF ONSET   ATYPICAL CHEST PAIN  Patient Active Problem List   Diagnosis    Chest pain    Paroxysmal atrial fibrillation (HCC)    Hypertriglyceridemia         RECOMMENDATIONS:     PT IS GIVEN OPTION OF MEDICAL RX VS KOMAL/CARDIOVERSION VS ABLATION, HE WANTS TO CONSIDER OPTIONS  WILL REVIEW STRESS AND ECHO  [id number] SCORE IS <1, SO ASA UNLESS HE AGREES TO CARDIOVERSION THEN WILL NEED A/C FOR 3-4 WEEKS    Conrado Loco M.D.

## 2018-01-12 ENCOUNTER — APPOINTMENT (OUTPATIENT)
Dept: NON INVASIVE DIAGNOSTICS | Age: 33
DRG: 310 | End: 2018-01-12

## 2018-01-12 ENCOUNTER — ANESTHESIA EVENT (OUTPATIENT)
Dept: NON INVASIVE DIAGNOSTICS | Age: 33
DRG: 310 | End: 2018-01-12

## 2018-01-12 ENCOUNTER — ANESTHESIA (OUTPATIENT)
Dept: NON INVASIVE DIAGNOSTICS | Age: 33
DRG: 310 | End: 2018-01-12

## 2018-01-12 VITALS
WEIGHT: 184.06 LBS | HEIGHT: 69 IN | SYSTOLIC BLOOD PRESSURE: 111 MMHG | OXYGEN SATURATION: 99 % | BODY MASS INDEX: 27.26 KG/M2 | HEART RATE: 66 BPM | TEMPERATURE: 97.7 F | RESPIRATION RATE: 15 BRPM | DIASTOLIC BLOOD PRESSURE: 46 MMHG

## 2018-01-12 VITALS
RESPIRATION RATE: 14 BRPM | SYSTOLIC BLOOD PRESSURE: 134 MMHG | OXYGEN SATURATION: 97 % | DIASTOLIC BLOOD PRESSURE: 59 MMHG

## 2018-01-12 PROBLEM — G47.33 OBSTRUCTIVE SLEEP APNEA SYNDROME: Status: ACTIVE | Noted: 2018-01-12

## 2018-01-12 PROCEDURE — 93312 ECHO TRANSESOPHAGEAL: CPT

## 2018-01-12 PROCEDURE — 2580000003 HC RX 258: Performed by: NURSE ANESTHETIST, CERTIFIED REGISTERED

## 2018-01-12 PROCEDURE — 7100000010 HC PHASE II RECOVERY - FIRST 15 MIN

## 2018-01-12 PROCEDURE — 3700000000 HC ANESTHESIA ATTENDED CARE

## 2018-01-12 PROCEDURE — 99239 HOSP IP/OBS DSCHRG MGMT >30: CPT | Performed by: FAMILY MEDICINE

## 2018-01-12 PROCEDURE — 7100000011 HC PHASE II RECOVERY - ADDTL 15 MIN

## 2018-01-12 PROCEDURE — 5A2204Z RESTORATION OF CARDIAC RHYTHM, SINGLE: ICD-10-PCS | Performed by: INTERNAL MEDICINE

## 2018-01-12 PROCEDURE — 6360000002 HC RX W HCPCS: Performed by: NURSE ANESTHETIST, CERTIFIED REGISTERED

## 2018-01-12 PROCEDURE — 3700000001 HC ADD 15 MINUTES (ANESTHESIA)

## 2018-01-12 PROCEDURE — 6370000000 HC RX 637 (ALT 250 FOR IP): Performed by: INTERNAL MEDICINE

## 2018-01-12 RX ORDER — PROPOFOL 10 MG/ML
INJECTION, EMULSION INTRAVENOUS PRN
Status: DISCONTINUED | OUTPATIENT
Start: 2018-01-12 | End: 2018-01-12 | Stop reason: SDUPTHER

## 2018-01-12 RX ORDER — PROPAFENONE HYDROCHLORIDE 150 MG/1
150 TABLET, FILM COATED ORAL EVERY 8 HOURS SCHEDULED
Qty: 90 TABLET | Refills: 3 | Status: SHIPPED | OUTPATIENT
Start: 2018-01-12

## 2018-01-12 RX ORDER — SODIUM CHLORIDE 9 MG/ML
INJECTION, SOLUTION INTRAVENOUS CONTINUOUS
Status: CANCELLED | OUTPATIENT
Start: 2018-01-12

## 2018-01-12 RX ORDER — PROPAFENONE HYDROCHLORIDE 150 MG/1
150 TABLET, FILM COATED ORAL EVERY 8 HOURS SCHEDULED
Qty: 90 TABLET | Refills: 3 | Status: CANCELLED | OUTPATIENT
Start: 2018-01-12

## 2018-01-12 RX ORDER — SODIUM CHLORIDE 0.9 % (FLUSH) 0.9 %
10 SYRINGE (ML) INJECTION PRN
Status: CANCELLED | OUTPATIENT
Start: 2018-01-12

## 2018-01-12 RX ORDER — ONDANSETRON 2 MG/ML
4 INJECTION INTRAMUSCULAR; INTRAVENOUS
Status: DISCONTINUED | OUTPATIENT
Start: 2018-01-12 | End: 2018-01-12 | Stop reason: CLARIF

## 2018-01-12 RX ORDER — SODIUM CHLORIDE 0.9 % (FLUSH) 0.9 %
10 SYRINGE (ML) INJECTION EVERY 12 HOURS SCHEDULED
Status: CANCELLED | OUTPATIENT
Start: 2018-01-12

## 2018-01-12 RX ORDER — FENTANYL CITRATE 50 UG/ML
50 INJECTION, SOLUTION INTRAMUSCULAR; INTRAVENOUS EVERY 5 MIN PRN
Status: DISCONTINUED | OUTPATIENT
Start: 2018-01-12 | End: 2018-01-12 | Stop reason: CLARIF

## 2018-01-12 RX ORDER — SODIUM CHLORIDE 9 MG/ML
INJECTION, SOLUTION INTRAVENOUS CONTINUOUS PRN
Status: DISCONTINUED | OUTPATIENT
Start: 2018-01-12 | End: 2018-01-12 | Stop reason: SDUPTHER

## 2018-01-12 RX ORDER — MIDAZOLAM HYDROCHLORIDE 1 MG/ML
INJECTION INTRAMUSCULAR; INTRAVENOUS PRN
Status: DISCONTINUED | OUTPATIENT
Start: 2018-01-12 | End: 2018-01-12 | Stop reason: SDUPTHER

## 2018-01-12 RX ORDER — PROPAFENONE HYDROCHLORIDE 150 MG/1
150 TABLET, FILM COATED ORAL EVERY 8 HOURS SCHEDULED
Status: DISCONTINUED | OUTPATIENT
Start: 2018-01-12 | End: 2018-01-12 | Stop reason: HOSPADM

## 2018-01-12 RX ORDER — OXYCODONE HYDROCHLORIDE AND ACETAMINOPHEN 5; 325 MG/1; MG/1
1 TABLET ORAL
Status: DISCONTINUED | OUTPATIENT
Start: 2018-01-12 | End: 2018-01-12 | Stop reason: CLARIF

## 2018-01-12 RX ORDER — FENTANYL CITRATE 50 UG/ML
25 INJECTION, SOLUTION INTRAMUSCULAR; INTRAVENOUS EVERY 5 MIN PRN
Status: DISCONTINUED | OUTPATIENT
Start: 2018-01-12 | End: 2018-01-12 | Stop reason: CLARIF

## 2018-01-12 RX ADMIN — PROPOFOL 30 MG: 10 INJECTION, EMULSION INTRAVENOUS at 09:28

## 2018-01-12 RX ADMIN — PROPOFOL 70 MG: 10 INJECTION, EMULSION INTRAVENOUS at 09:26

## 2018-01-12 RX ADMIN — SODIUM CHLORIDE: 9 INJECTION, SOLUTION INTRAVENOUS at 09:19

## 2018-01-12 RX ADMIN — PROPAFENONE HYDROCHLORIDE 150 MG: 150 TABLET, FILM COATED ORAL at 12:39

## 2018-01-12 RX ADMIN — RIVAROXABAN 20 MG: 20 TABLET, FILM COATED ORAL at 12:39

## 2018-01-12 RX ADMIN — MIDAZOLAM 2 MG: 1 INJECTION INTRAMUSCULAR; INTRAVENOUS at 09:25

## 2018-01-12 ASSESSMENT — PULMONARY FUNCTION TESTS
PIF_VALUE: 0
PIF_VALUE: 1
PIF_VALUE: 0
PIF_VALUE: 1
PIF_VALUE: 0

## 2018-01-12 NOTE — ANESTHESIA POSTPROCEDURE EVALUATION
Department of Anesthesiology  Postprocedure Note    Patient: Renee Patel  MRN: [id number]  YOB: 1985  Date of evaluation: 1/12/2018  Time:  9:53 AM     Procedure Summary     Date:  01/12/18 Room / Location:  [facility]; [facility]    Anesthesia Start:  0919 Anesthesia Stop:  3144    Procedure:  STA CARDIOVERSN KOMAL ANESTHESIA Diagnosis:      Scheduled Providers:   Responsible Provider:  Jaida Abdalla MD    Anesthesia Type:  general, MAC ASA Status:  2          Anesthesia Type: No value filed. Dipika Phase I:      Dipika Phase II: Dipika Score: 4    Last vitals: Reviewed and per EMR flowsheets.        Anesthesia Post Evaluation    Patient location during evaluation: bedside  Patient participation: complete - patient participated  Level of consciousness: awake and alert  Airway patency: patent  Nausea & Vomiting: no nausea  Complications: no  Cardiovascular status: hemodynamically stable  Respiratory status: nonlabored ventilation  Hydration status: euvolemic

## 2018-01-12 NOTE — ANESTHESIA PRE PROCEDURE
magnesium hydroxide (MILK OF MAGNESIA) 400 MG/5ML suspension 30 mL  30 mL Oral Daily PRN Devendra Quinones CNP        docusate sodium (COLACE) capsule 100 mg  100 mg Oral BID Devendra Quinones CNP        bisacodyl (DULCOLAX) suppository 10 mg  10 mg Rectal Daily PRN Devendra Quinones CNP        ondansetron [facility]) injection 4 mg  4 mg Intravenous Q6H PRN Devednra Quinones CNP           Allergies:  No Known Allergies    Problem List:    Patient Active Problem List   Diagnosis Code    Chest pain R07.9    Paroxysmal atrial fibrillation (HCC) I48.0    Hypertriglyceridemia E78.1       Past Medical History:        Diagnosis Date    Bradycardia        Past Surgical History:        Procedure Laterality Date    OTHER SURGICAL HISTORY  01/12/2018    Gunner and cardioversion        Social History:    Social History   Substance Use Topics    Smoking status: Never Smoker    Smokeless tobacco: Never Used    Alcohol use No                                Counseling given: Not Answered      Vital Signs (Current):   Vitals:    01/12/18 0448 01/12/18 0627 01/12/18 0939 01/12/18 0945   BP: 94/62 105/65 116/62 104/65   Pulse: 68 77 68 71   Resp: 18 16 15 15   Temp: 97.7 °F (36.5 °C) 97.9 °F (36.6 °C) 98.2 °F (36.8 °C)    TempSrc: Oral Oral Temporal    SpO2: 98% 98% 98% 98%   Weight:       Height:                                                  BP Readings from Last 3 Encounters:   01/12/18 104/65   01/12/18 (!) 134/59       NPO Status:                                                                                 BMI:   Wt Readings from Last 3 Encounters:   01/10/18 184 lb 1 oz (83.5 kg)     Body mass index is 27.18 kg/m².     CBC:   Lab Results   Component Value Date    WBC 7.8 01/11/2018    RBC 5.57 01/11/2018    HGB 15.9 01/11/2018    HCT 46.7 01/11/2018    MCV 83.8 01/11/2018    RDW 13.0 01/11/2018     01/11/2018       CMP:   Lab Results   Component Value Date     01/11/2018    K 3.9 01/11/2018  01/11/2018    CO2 27 01/11/2018    BUN 15 01/11/2018    CREATININE 0.91 01/11/2018    GFRAA >60 01/11/2018    LABGLOM >60 01/11/2018    GLUCOSE 99 01/11/2018    PROT 6.7 01/11/2018    CALCIUM 8.8 01/11/2018    BILITOT 0.74 01/11/2018    ALKPHOS 74 01/11/2018    AST 16 01/11/2018    ALT 20 01/11/2018       POC Tests: No results for input(s): POCGLU, POCNA, POCK, POCCL, POCBUN, POCHEMO, POCHCT in the last 72 hours. Coags:   Lab Results   Component Value Date    PROTIME 10.2 01/10/2018    INR 1.0 01/10/2018    APTT 31.5 01/10/2018       HCG (If Applicable): No results found for: PREGTESTUR, PREGSERUM, HCG, HCGQUANT     ABGs: No results found for: PHART, PO2ART, DFA9PUS, ZVE9DOG, BEART, Q9DGALER     Type & Screen (If Applicable):  No results found for: LABABO, LABRH    Anesthesia Evaluation    Airway: Mallampati: I  TM distance: >3 FB   Neck ROM: full  Mouth opening: > = 3 FB Dental:          Pulmonary:Negative Pulmonary ROS                              Cardiovascular:    (+) dysrhythmias:,     (-)  angina      Rhythm: irregular                      Neuro/Psych:               GI/Hepatic/Renal:             Endo/Other:                     Abdominal:           Vascular:                                        Anesthesia Plan      general and MAC     ASA 2             Anesthetic plan and risks discussed with patient. Plan discussed with CRNA.                   Faiza Desir MD   1/12/2018

## 2018-01-12 NOTE — PROGRESS NOTES
Consent signed. Patient attached to anesthesia monitor. Time out completed. Gunner completed with no abnormalities. Cardioversion started with synched monitor and then 1 shock at 200j. Patient converted to NSR. Patient transferred to recovery.
Pharmacy Medication History Note      The patient does not currently take any prescription or OTC maintenance medications at home. No changes to the patient's home medication list were necessary. Source(s) of information: patient, spouse    Discrepancies on current hospital orders that need to be addressed by a physician: none      Please feel free to call me with any questions about this encounter. Thank you.     Olayinka Simpson [address] [place] [address]  Pharmacy Medication Accuracy Review Service  Phone: [phone]  Fax: [phone]    Electronically signed by Olayinka Simpson [address] [place] [address] on 1/11/2018 at 5:24 PM
Pt arrived to floor via stretcher from ED and was transfered to bed. Vitals taken. Admission and assessment complete. No distress noted. See doc flowsheet and admission navigator for details. POC and education initiated and reviewed with patient. Call light within reach, and pt educated on its use. Bed in lowest position, and locked. Side rails up x 2. Denied further questions or needs at this time. Will continue to monitor.
Pt discharged to home by car with wife, pt read and understood discharge instructions, pt wife already picked up xarelto and rhtyhmol form [facility] pharmacy, pt will follow up with Dr. Whitley Jones in 3 weeks, pt discharged in stable condition
appearance:  alert, cooperative and no distress  Mental Status:  oriented to person, place and time and normal affect  Lungs:  clear to auscultation bilaterally, normal effort  Heart:  regular rate and rhythm, no murmur  Abdomen:  soft, nontender, nondistended, normal bowel sounds, no masses, hepatomegaly, splenomegaly  Extremities:  no edema, redness, tenderness in the calves  Skin:  no gross lesions, rashes, induration              Assessment:        Primary Problem  Paroxysmal atrial fibrillation [facility])    Active Hospital Problems    Diagnosis Date Noted    Paroxysmal atrial fibrillation ([facility] [address] [I48.0] 01/11/2018    Hypertriglyceridemia [E78.1] 01/11/2018    Chest pain [R07.9] 01/10/2018       Plan:          Paroxysmal atrial fibrillation . Status post cardioversion will need anticoagulation for 1 month and follow-up with Dr.    Chest pain. Lexiscan negative    Hypertriglyceridemia. For diet and exercise  Obstructive Sleep Apnea: Outpatient sleep study at [facility]. Disposition for home and follow-up with PCP within a week and cardiologist in 1-2 weeks.       Regla Husain MD  1/12/2018  2:49 PM

## 2018-01-12 NOTE — DISCHARGE SUMMARY
[facility]    Discharge Summary     Patient ID: Renee Patel  :  1985   MRN: [id number]     ACCOUNT:  [de-identified]   Patient's PCP: No primary care provider on file. Admit Date: 1/10/2018   Discharge Date: 2018     Length of Stay: 2  Code Status:  Full Code  Admitting Physician: Maximino Guidry MD  Discharge Physician: Cuong Jose MD     Active Discharge Diagnoses:     Primary Problem  Paroxysmal atrial fibrillation [facility])      [facility] Problems    Diagnosis Date Noted    Paroxysmal atrial fibrillation ([facility] [address] [I48.0] 2018    Hypertriglyceridemia [E78.1] 2018    Chest pain [R07.9] 01/10/2018       Admission Condition:  fair     Discharged Condition: good    Hospital Stay:     Hospital Course: This is a 41-year-old male patient who recently migrated from [place]. Had similar episode of chest tightness, palpitations, a year ago and now his symptoms recurred seen in the ER was in atrial fibrillation, had cardioversion done and he returned to sinus rhythm. Patient was seen by cardiologist and sent home on Xarelto for 3-4 weeks. His lipids are normal.  Troponins were normal.  Echo was normal.  No valvular abnormality, patient had Lexiscan, was negative. KOMAL was performed without complications, ejection fraction 55%. No thrombus or valvular vegetation. Mild TR  However, on further exploring his history. He was positive for Hinckley sleep scale and was advised to do a sleep study has an outpatient. He was stable and hence appropriate for discharge home.       Significant therapeutic interventions: Cardioversion Xarelto, Lexiscan, KOMAL, Rythmol 150 3 times a day    Significant Diagnostic Studies:   Labs / Micro:  CBC:   Lab Results   Component Value Date    WBC 7.8 2018    RBC 5.57 2018    HGB 15.9 2018    HCT 46.7 2018    MCV 83.8 2018    MCH 28.6 01/11/2018    MCHC 34.1 01/11/2018    RDW 13.0 01/11/2018     01/11/2018     BMP:    Lab Results   Component Value Date    GLUCOSE 99 01/11/2018     01/11/2018    K 3.9 01/11/2018     01/11/2018    CO2 27 01/11/2018    ANIONGAP 11 01/11/2018    BUN 15 01/11/2018    CREATININE 0.91 01/11/2018    BUNCRER 16 01/11/2018    CALCIUM 8.8 01/11/2018    LABGLOM >60 01/11/2018    GFRAA >60 01/11/2018    GFR      01/11/2018    GFR NOT REPORTED 01/11/2018     HFP:    Lab Results   Component Value Date    PROT 6.7 01/11/2018     CMP:    Lab Results   Component Value Date    GLUCOSE 99 01/11/2018     01/11/2018    K 3.9 01/11/2018     01/11/2018    CO2 27 01/11/2018    BUN 15 01/11/2018    CREATININE 0.91 01/11/2018    ANIONGAP 11 01/11/2018    ALKPHOS 74 01/11/2018    ALT 20 01/11/2018    AST 16 01/11/2018    BILITOT 0.74 01/11/2018    LABALBU 4.1 01/11/2018    ALBUMIN NOT REPORTED 01/11/2018    LABGLOM >60 01/11/2018    GFRAA >60 01/11/2018    GFR      01/11/2018    GFR NOT REPORTED 01/11/2018    PROT 6.7 01/11/2018    CALCIUM 8.8 01/11/2018     PT/INR:  No results found for: PTINR  PTT:   Lab Results   Component Value Date    APTT 31.5 01/10/2018     FLP:    Lab Results   Component Value Date    CHOL 194 01/11/2018    TRIG 217 01/11/2018    HDL 30 01/11/2018     U/A:    Lab Results   Component Value Date    COLORU YELLOW 01/10/2018    TURBIDITY CLEAR 01/10/2018    SPECGRAV 1.025 01/10/2018    HGBUR NEGATIVE 01/10/2018    PHUR 6.0 01/10/2018    PROTEINU NEGATIVE 01/10/2018    GLUCOSEU NEGATIVE 01/10/2018    KETUA NEGATIVE 01/10/2018    BILIRUBINUR NEGATIVE 01/10/2018    UROBILINOGEN Normal 01/10/2018    NITRU NEGATIVE 01/10/2018    LEUKOCYTESUR NEGATIVE 01/10/2018     TSH:    Lab Results   Component Value Date    TSH 1.68 01/10/2018       Radiology:    Xr Chest 1 Vw    Result Date: 1/10/2018  EXAMINATION: SINGLE VIEW OF THE CHEST 1/10/2018 6:31 pm COMPARISON: None.  HISTORY: ORDERING SYSTEM ACC/AATS/AHA/ASE/ASNC/SCAI/SCCT/STS 2017 Appropriate Use Criteria For Coronary Revascularization in Patients With Stable Ischemic Heart Disease [facility] Volume 69, Issue 17, May 2017 High risk (>3% annual death or MI) 1. Severe resting LV dysfunction (LVEF <35%) not readily explained by non coronary causes 2. Resting perfusion abnormalities greater than 10% of the myocardium in patients without prior history or evidence of MI3. Stress-induced perfusion abnormalities encumbering greater than or equal to 10% myocardium or stress segmental scores indicating multiple vascular territories with abnormalities 4. Stress-induced LV dilatation (TID ratio greater than 1.19 for exercise and greater than 1.39 for regadenoson) Intermediate risk (1% to 3% annual death or MI) 1. Mild/moderate resting LV dysfunction (LVEF 35% to 49%) not readily explained by non coronary causes. 2. Resting perfusion abnormalities in 5%-9.9% of the myocardium in patients without a history or prior evidence of MI 3. Stress-induced perfusion abnormality encumbering 5%-9.9% of the myocardium or stress segmental scores indicating 1 vascular territory with abnormalities but without LV dilation 4. Small wall motion abnormality involving 1-2 segments and only 1 coronary bed. Low Risk (Less than 1% annual death or MI) 1. Normal or small myocardial perfusion defect at rest or with stress encumbering less than 5% of the myocardium. Consultations:    Consults:     Final Specialist Recommendations/Findings:   IP CONSULT TO INTERNAL MEDICINE  IP CONSULT TO INTERNAL MEDICINE  IP CONSULT TO CARDIOLOGY      The patient was seen and examined on day of discharge and this discharge summary is in conjunction with any daily progress note from day of discharge. Discharge plan:     Disposition: Home    Physician Follow Up:     No follow-up provider specified.      Requiring Further Evaluation/Follow Up POST HOSPITALIZATION/Incidental Findings: WATCH FR BLEEDING AND

## 2018-01-14 ENCOUNTER — HOSPITAL ENCOUNTER (EMERGENCY)
Age: 33
Discharge: HOME | End: 2018-01-14
Attending: EMERGENCY MEDICINE

## 2018-01-14 VITALS
HEIGHT: 64 IN | BODY MASS INDEX: 31.82 KG/M2 | WEIGHT: 186.38 LBS | HEART RATE: 70 BPM | TEMPERATURE: 98.5 F | DIASTOLIC BLOOD PRESSURE: 72 MMHG | OXYGEN SATURATION: 100 % | RESPIRATION RATE: 14 BRPM | SYSTOLIC BLOOD PRESSURE: 119 MMHG

## 2018-01-14 DIAGNOSIS — R04.0 EPISTAXIS: Primary | ICD-10-CM

## 2018-01-14 PROCEDURE — 99282 EMERGENCY DEPT VISIT SF MDM: CPT

## 2018-01-14 RX ORDER — ECHINACEA PURPUREA EXTRACT 125 MG
1 TABLET ORAL PRN
Qty: 1 BOTTLE | Refills: 3 | Status: SHIPPED | OUTPATIENT
Start: 2018-01-14

## 2018-01-14 ASSESSMENT — ENCOUNTER SYMPTOMS
RESPIRATORY NEGATIVE: 1
GASTROINTESTINAL NEGATIVE: 1

## 2018-01-14 NOTE — ED NOTES
Patient presents to the er with concerns of nose bleed due to recent start of xarelto. Patient had nose bleed at home, but currently not actively bleeding.       Godfrey Gonzales RN  01/14/18 [id number]

## 2025-04-23 NOTE — ED NOTES
LOS: 3 days   Patient Care Team:  Nathan Zimmer MD as PCP - General (Internal Medicine)  Adrien Brewster MD as Consulting Physician (Gastroenterology)  Eleuterio Farley MD (Inactive) as Cardiologist (Cardiology)  Elena Jon APRN as Referring Physician (Vascular Surgery)  Bolivar Rueda MD as Consulting Physician (Nephrology)  Slava Tate APRN as Nurse Practitioner (Family Medicine)  New Omalley MD as Consulting Physician (Pulmonary Disease)  Becky Camacho APRN as Nurse Practitioner (Hematology and Oncology)  Gil Pineda DO as Consulting Physician (Vascular Surgery)    Chief Complaint: Right lower extremity pain    Subjective     Patient Complaints: Patient complains of right lower extremity rest pain.  He does state it feels better when he dangles his right leg.  He is doing well with dialysis and feels much better.  We discussed proceeding with right lower extremity angiogram tomorrow.  Risks of angiogram were discussed.  These include, but are not limited to, bleeding, infection, vessel damage, nerve damage, embolus, and loss of limb.  The patient understands these risks and wishes to proceed with procedure.      Objective     Vital Signs  Temp:  [97.4 °F (36.3 °C)-99 °F (37.2 °C)] 97.8 °F (36.6 °C)  Heart Rate:  [47-73] 56  Resp:  [16] 16  BP: (134-197)/(40-53) 173/53    Physical Exam  Vitals and nursing note reviewed.   Constitutional:       General: He is not in acute distress.     Appearance: Normal appearance. He is underweight. He is not diaphoretic.   HENT:      Head: Normocephalic. No right periorbital erythema or left periorbital erythema.      Nose: Nose normal.   Eyes:      General: No scleral icterus.     Pupils: Pupils are equal.   Cardiovascular:      Rate and Rhythm: Normal rate and regular rhythm.      Pulses: Normal pulses.      Heart sounds: Normal heart sounds. No murmur heard.  Pulmonary:      Effort: Pulmonary effort is  Had KOMAL and cardioversion for afib on 01/12. Was told any bleeding to return to the ER. Woke up with some blood from left nares states few drops and stopped on its own.       Herbert Salomon RN  01/14/18 0153 normal. No respiratory distress.      Breath sounds: Normal breath sounds.   Abdominal:      General: Bowel sounds are normal. There is no distension.      Palpations: Abdomen is soft.      Tenderness: There is no abdominal tenderness. There is no guarding.   Musculoskeletal:         General: No swelling or tenderness. Normal range of motion.      Cervical back: Normal range of motion and neck supple.      Right lower leg: No edema.      Left lower leg: No edema.        Feet:    Feet:      Left foot:      Skin integrity: Skin integrity normal.      Comments: Right toes cool to touch, skin is ready at distal foot, pulses are not dopplerable in elevated position, only dependent.  Skin:     General: Skin is warm and dry.      Findings: No erythema or rash.   Neurological:      General: No focal deficit present.      Mental Status: He is alert and oriented to person, place, and time. Mental status is at baseline.      Cranial Nerves: No cranial nerve deficit.      Gait: Gait normal.   Psychiatric:         Attention and Perception: Attention normal.         Mood and Affect: Mood normal.         Behavior: Behavior normal.         Thought Content: Thought content normal.         Judgment: Judgment normal.         Laboratory Data:   Results from last 7 days   Lab Units 04/23/25  0619 04/22/25  0245 04/21/25  0850   WBC 10*3/mm3 6.06 4.42 4.58   HEMOGLOBIN g/dL 9.6* 8.2* 8.4*   HEMATOCRIT % 30.9* 27.1* 27.6*   PLATELETS 10*3/mm3 124* 97* 96*       Results from last 7 days   Lab Units 04/23/25  0619 04/22/25  0245 04/21/25  0850 04/20/25  1732 04/20/25  1400   SODIUM mmol/L 136 138 137  137   < >  --    POTASSIUM mmol/L 4.1 4.4 4.9  4.9   < >  --    CHLORIDE mmol/L 99 101 105  105   < >  --    CO2 mmol/L 27.0 25.0 17.0*  17.0*   < >  --    BUN mg/dL 44* 55* 99*  99*   < >  --    CREATININE mg/dL 2.80* 3.50* 4.35*  4.35*   < >  --    CALCIUM mg/dL 8.3* 8.3* 8.5*  8.5*   < >  --    BILIRUBIN mg/dL  --   --  0.2  --  0.2    ALK PHOS U/L  --   --  145*  --  176*   ALT (SGPT) U/L  --   --  10  --  10   AST (SGOT) U/L  --   --  14  --  17   GLUCOSE mg/dL 100* 93 107*  107*   < >  --     < > = values in this interval not displayed.                Medication Review: Reviewed    Assessment & Plan       Anemia, multifactorial to include chronic kidney disease, iron deficiency and possible bleed    Resistant hypertension    CKD (chronic kidney disease) stage 5    Acute pain of lower extremity, right    Hyperkalemia  PAD        Plan for disposition:  Plan for right lower extremity angiogram tomorrow  Nursing to obtain consent form.  N.p.o. after midnight.  If patient has increased pain in the right lower extremity at rest,  place in dependent position.    Melanie Ayala, STERLING  Vascular Surgery  [phone]  04/23/25  14:07 CDT